# Patient Record
Sex: MALE | Race: WHITE | NOT HISPANIC OR LATINO | Employment: PART TIME | URBAN - METROPOLITAN AREA
[De-identification: names, ages, dates, MRNs, and addresses within clinical notes are randomized per-mention and may not be internally consistent; named-entity substitution may affect disease eponyms.]

---

## 2017-04-14 ENCOUNTER — TELEPHONE (OUTPATIENT)
Dept: OTHER | Facility: CLINIC | Age: 62
End: 2017-04-14

## 2017-04-14 NOTE — TELEPHONE ENCOUNTER
4/14/2017    Call Regarding Onboarding [employer]    Attempt 1    Message on voicemail     Comments:       Outreach   erik

## 2017-06-01 NOTE — TELEPHONE ENCOUNTER
6/1/2017    Call Regarding Onboarding [employer]    Attempt 2    Message on voicemail     Comments: No dep      Outreach   cnt

## 2017-06-08 NOTE — TELEPHONE ENCOUNTER
6/8/2017    Call Regarding Onboarding [employer]    Attempt 3    Message on voicemail     Comments: No dep        Outreach   cnt

## 2022-04-05 ENCOUNTER — TRANSFERRED RECORDS (OUTPATIENT)
Dept: HEALTH INFORMATION MANAGEMENT | Facility: CLINIC | Age: 67
End: 2022-04-05
Payer: MEDICARE

## 2022-04-21 ENCOUNTER — TRANSFERRED RECORDS (OUTPATIENT)
Dept: HEALTH INFORMATION MANAGEMENT | Facility: CLINIC | Age: 67
End: 2022-04-21
Payer: MEDICARE

## 2022-04-29 ENCOUNTER — MEDICAL CORRESPONDENCE (OUTPATIENT)
Dept: HEALTH INFORMATION MANAGEMENT | Facility: CLINIC | Age: 67
End: 2022-04-29
Payer: MEDICARE

## 2022-05-03 ENCOUNTER — TELEPHONE (OUTPATIENT)
Dept: INFECTIOUS DISEASES | Facility: CLINIC | Age: 67
End: 2022-05-03
Payer: COMMERCIAL

## 2022-05-03 NOTE — TELEPHONE ENCOUNTER
External - [facility] [phone]  Referring Provider:  Dr. Goode  DX: Lymes and Rash    Ref./rec. Were received in ID consult folder on 04.29.2022 @ 3:18 & 3:24    *Please review and advise on scheduling.

## 2022-05-03 NOTE — TELEPHONE ENCOUNTER
Per referring office will fax additional records. Please watch for records and advise on scheduling.

## 2022-05-03 NOTE — TELEPHONE ENCOUNTER
Please call referring for office notes. No provider documentation received. Has patient been treated?

## 2022-05-04 NOTE — TELEPHONE ENCOUNTER
Office visit note received.    Ok to schedule next available consult opening.    5/3/2022 12:18:51 PM Transmission Record  5/3/2022 12:09:02 PM Conversion  duplicate labs 5/3/2022 12:13:18 PM Transmission Record

## 2022-05-05 ENCOUNTER — LAB (OUTPATIENT)
Dept: LAB | Facility: CLINIC | Age: 67
End: 2022-05-05

## 2022-05-05 ENCOUNTER — HOSPITAL ENCOUNTER (OUTPATIENT)
Dept: GENERAL RADIOLOGY | Facility: HOSPITAL | Age: 67
Discharge: HOME | End: 2022-05-05
Attending: INTERNAL MEDICINE | Admitting: INTERNAL MEDICINE
Payer: COMMERCIAL

## 2022-05-05 ENCOUNTER — OFFICE VISIT (OUTPATIENT)
Dept: INFECTIOUS DISEASES | Facility: CLINIC | Age: 67
End: 2022-05-05
Payer: COMMERCIAL

## 2022-05-05 VITALS — SYSTOLIC BLOOD PRESSURE: 120 MMHG | DIASTOLIC BLOOD PRESSURE: 60 MMHG | WEIGHT: 129 LBS | HEART RATE: 72 BPM

## 2022-05-05 DIAGNOSIS — A69.23 LYME ARTHRITIS (H): Primary | ICD-10-CM

## 2022-05-05 DIAGNOSIS — A69.23 LYME ARTHRITIS (H): ICD-10-CM

## 2022-05-05 LAB
BASOPHILS # BLD AUTO: 0 10E3/UL (ref 0–0.2)
BASOPHILS NFR BLD AUTO: 0 %
C REACTIVE PROTEIN LHE: <0.1 MG/DL (ref 0–0.8)
EOSINOPHIL # BLD AUTO: 0.2 10E3/UL (ref 0–0.7)
EOSINOPHIL NFR BLD AUTO: 4 %
ERYTHROCYTE [DISTWIDTH] IN BLOOD BY AUTOMATED COUNT: 12.5 % (ref 10–15)
ERYTHROCYTE [SEDIMENTATION RATE] IN BLOOD BY WESTERGREN METHOD: 12 MM/HR (ref 0–15)
HCT VFR BLD AUTO: 36.1 % (ref 40–53)
HGB BLD-MCNC: 12.1 G/DL (ref 13.3–17.7)
IMM GRANULOCYTES # BLD: 0 10E3/UL
IMM GRANULOCYTES NFR BLD: 0 %
LDH SERPL L TO P-CCNC: 192 U/L (ref 125–220)
LYMPHOCYTES # BLD AUTO: 1.4 10E3/UL (ref 0.8–5.3)
LYMPHOCYTES NFR BLD AUTO: 26 %
MCH RBC QN AUTO: 32 PG (ref 26.5–33)
MCHC RBC AUTO-ENTMCNC: 33.5 G/DL (ref 31.5–36.5)
MCV RBC AUTO: 96 FL (ref 78–100)
MONOCYTES # BLD AUTO: 0.6 10E3/UL (ref 0–1.3)
MONOCYTES NFR BLD AUTO: 10 %
NEUTROPHILS # BLD AUTO: 3.3 10E3/UL (ref 1.6–8.3)
NEUTROPHILS NFR BLD AUTO: 59 %
PLATELET # BLD AUTO: 257 10E3/UL (ref 150–450)
RBC # BLD AUTO: 3.78 10E6/UL (ref 4.4–5.9)
WBC # BLD AUTO: 5.5 10E3/UL (ref 4–11)

## 2022-05-05 PROCEDURE — 86140 C-REACTIVE PROTEIN: CPT

## 2022-05-05 PROCEDURE — 99203 OFFICE O/P NEW LOW 30 MIN: CPT | Performed by: INTERNAL MEDICINE

## 2022-05-05 PROCEDURE — 85025 COMPLETE CBC W/AUTO DIFF WBC: CPT

## 2022-05-05 PROCEDURE — 85652 RBC SED RATE AUTOMATED: CPT

## 2022-05-05 PROCEDURE — 73630 X-RAY EXAM OF FOOT: CPT | Mod: LT,FY

## 2022-05-05 PROCEDURE — 83615 LACTATE (LD) (LDH) ENZYME: CPT

## 2022-05-05 PROCEDURE — 36415 COLL VENOUS BLD VENIPUNCTURE: CPT

## 2022-05-05 RX ORDER — HYDROCODONE BITARTRATE AND ACETAMINOPHEN 5; 325 MG/1; MG/1
1 TABLET ORAL EVERY 6 HOURS PRN
COMMUNITY
Start: 2022-04-28

## 2022-05-05 RX ORDER — MINOCYCLINE HYDROCHLORIDE 100 MG/1
1 CAPSULE ORAL EVERY 12 HOURS
COMMUNITY
Start: 2022-04-21 | End: 2022-05-05

## 2022-05-05 RX ORDER — METHYLSULFONYLMETHANE 500 MG
CAPSULE ORAL SEE ADMIN INSTRUCTIONS
COMMUNITY

## 2022-05-05 RX ORDER — CHLORAL HYDRATE 500 MG
1 CAPSULE ORAL DAILY
COMMUNITY

## 2022-05-05 RX ORDER — CHOLECALCIFEROL (VITAMIN D3) 50 MCG
1 TABLET ORAL
COMMUNITY

## 2022-05-05 RX ORDER — NYSTATIN 100000 U/G
CREAM TOPICAL
COMMUNITY

## 2022-05-05 RX ORDER — AMOXICILLIN 500 MG/1
2 TABLET, FILM COATED ORAL 2 TIMES DAILY
COMMUNITY
Start: 2022-04-08 | End: 2022-05-08

## 2022-05-05 RX ORDER — TRIAMCINOLONE ACETONIDE 1 MG/G
CREAM TOPICAL
COMMUNITY

## 2022-05-05 RX ORDER — DOXYCYCLINE HYCLATE 100 MG
TABLET ORAL
COMMUNITY
Start: 2022-04-08 | End: 2022-05-08

## 2022-05-05 NOTE — LETTER
5/5/2022         RE: Maico Dewitt  [address]  [place] [facility] [address]        Dear Colleague,    Thank you for referring your patient, Maico Dewitt, to the [facility]. Please see a copy of my visit note below.    [facility] INFECTIOUS DISEASE CLINIC - [place]    Date: 05/05/2022   Patient Name: Maico Dewitt   YOB: 1955  MRN: [id number]      ASSESSMENT:  66-year-old man without significant past medical history who was referred to ID clinic with recent diagnosis of Lyme arthritis.    1. Lyme arthritis.  Patient [place] in a tick endemic area.  Developed acute onset of left knee pain and swelling about 2 months ago.  Lyme IgG positive (10 out of 10 bands).  Received a 4-week course of amoxicillin.  He also received 2 weeks of doxycycline, followed by 2 weeks of minocycline (changed due to nausea symptoms).  Has had resolution of left knee pain.  Now complaining of 2 weeks of left foot pain.  No findings on exam.  Negative straight leg raise bilaterally      PLAN:  -No further antibiotics needed for Lyme disease  -Follow-up Lyme testing is not indicated as antibodies will stay positive  -Left foot x-ray  -We will check CBC, sed rate, CRP, and LDH  -Consider NSAIDs for pain as inflammation from Lyme arthritis can persist beyond treatment    Return to clinic as needed.    Kai Emery MD  [facility]   Clinic phone: [phone]   Clinic fax: [phone]     ______________________________________________________________________    HISTORY OF PRESENT ILLNESS:   Maico Dewitt is a 66 year old man who is referred for evaluation of Lyme arthritis.  The patient says [place] the [facility] [place] and is exposed to ticks regularly.  About 2 months ago he developed swelling and pain in his left knee.  A month ago he was diagnosed with Lyme disease and started on amoxicillin and doxycycline for 28 days.  He completed 2 weeks of doxycycline, but changed to  minocycline due to nausea.  He says the pain in his left knee has improved.  He saw an orthopedist at [facility] who did an x-ray and reported no signs of degenerative joint disease.  For the past 2 weeks, the patient has been having foot pain on the plantar surface of his left foot.  He reports shooting pain in this area along with warmth and redness.  He takes hydrocodone which helps.  He also takes ibuprofen intermittently.      Interval History:  Not applicable      Review of Systems:  Ten systems reviewed and negative except for what is noted in the HPI     Past Medical History:  No past medical history on file.    Past Surgical History:  History of spinal fusion    Allergies:  Allergies   Allergen Reactions     Beef-Derived Products Unknown       Medications:    Current Outpatient Medications:      amoxicillin (AMOXIL) 500 MG tablet, Take 2 tablets by mouth 2 times daily, Disp: , Rfl:      Ascorbic Acid (VITAMIN C) 500 MG CHEW, 2 tablet, Disp: , Rfl:      doxycycline hyclate (VIBRA-TABS) 100 MG tablet, 2 tablets, Disp: , Rfl:      fish oil-omega-3 fatty acids 1000 MG capsule, Take 1 capsule by mouth daily, Disp: , Rfl:      Garlic 1000 MG CAPS, See Admin Instructions, Disp: , Rfl:      Glucosamine-Chondroitin (GLUCOSAMINE CHONDR COMPLEX PO), , Disp: , Rfl:      HYDROcodone-acetaminophen (NORCO) 5-325 MG tablet, Take 1 tablet by mouth every 6 hours as needed, Disp: , Rfl:      Methylsulfonylmethane (MSM) 500 MG CAPS, See Admin Instructions, Disp: , Rfl:      minocycline (MINOCIN) 100 MG capsule, Take 1 capsule by mouth every 12 hours, Disp: , Rfl:      Multiple Vitamin (MULTIVITAMINS PO), , Disp: , Rfl:      N-Ovvmmhigwiiobtpqxh-O3-B12-FA (MOOD PLUS STRESS RELIEF PO), 1, Disp: , Rfl:      vitamin D3 (CHOLECALCIFEROL) 50 mcg (2000 units) tablet, Take 1 tablet by mouth, Disp: , Rfl:      nystatin (MYCOSTATIN) 759264 UNIT/GM external cream, USE ONE APPLICATION EXTERNALLY TWICE A DAY 30 DAYS (Patient not  taking: Reported on 5/5/2022), Disp: , Rfl:      triamcinolone (KENALOG) 0.1 % external cream, APPLY TO AFFECTED AREA TWICE DAILY FOR 30 DAYS (Patient not taking: Reported on 5/5/2022), Disp: , Rfl:     Social History:  Social History     Socioeconomic History     Marital status:      Spouse name: Not on file     Number of children: Not on file     Years of education: Not on file     Highest education level: Not on file   Occupational History     Not on file   Tobacco Use     Smoking status: Not on file     Smokeless tobacco: Not on file   Substance and Sexual Activity     Alcohol use: Not on file     Drug use: Not on file     Sexual activity: Not on file   Other Topics Concern     Not on file   Social History Narrative     Not on file     Social Determinants of Health     Financial Resource Strain: Not on file   Food Insecurity: Not on file   Transportation Needs: Not on file   Physical Activity: Not on file   Stress: Not on file   Social Connections: Not on file   Intimate Partner Violence: Not on file   Housing Stability: Not on file        Family History:  Both parents with lung cancer      PHYSICAL EXAM:    /60 (BP Location: Right arm, Patient Position: Sitting, Cuff Size: Adult Regular)   Pulse 72   Wt 58.5 kg (129 lb)     GENERAL:  well-developed, well-nourished, in no acute distress.   HENT:  Head is normocephalic, atraumatic. Oropharynx is moist without exudates or ulcers.  EYES:  Eyes have anicteric sclerae without conjunctival injection or stigmata of endocarditis.    NECK:  Supple. No cervical lymphadenopathy  LUNGS:  Clear to auscultation.  CARDIOVASCULAR:  Regular rate and rhythm with no murmurs, gallops or rubs.  ABDOMEN:  Normal bowel sounds, soft, nontender. No hepatosplenomegaly.  MUSCULOSKELETAL: Small effusion noted in the left knee without any erythema or overt swelling.  Left foot without swelling in the ankle or lower foot.  No noticeable erythema.  No ulcerations.  Good range  of motion in the ankle.  SKIN:  No acute rashes. No stigmata of endocarditis.  NEUROLOGIC: Grossly nonfocal.  Using a walker for ambulation        Pertinent labs:  Outside labs including Lyme serologies and CBC were reviewed    MICROBIOLOGY DATA:  None    RADIOLOGY:  None    Attestation:  Total time preparing to see this patient, face-to-face time, and coordinating care time on the same calendar date: 35 minutes.  Face-face time: 19 minutes.  Over 50% of face-to-face time was spent in counseling/coordination of care.        Again, thank you for allowing me to participate in the care of your patient.        Sincerely,        Kai Emery MD

## 2022-05-05 NOTE — PROGRESS NOTES
[facility] INFECTIOUS DISEASE CLINIC [facility]    Date: 05/05/2022   Patient Name: Maico Dewitt   YOB: 1955  MRN: [id number]      ASSESSMENT:  66-year-old man without significant past medical history who was referred to ID clinic with recent diagnosis of Lyme arthritis.    1. Lyme arthritis.  Patient [place] in a tick endemic area.  Developed acute onset of left knee pain and swelling about 2 months ago.  Lyme IgG positive (10 out of 10 bands).  Received a 4-week course of amoxicillin.  He also received 2 weeks of doxycycline, followed by 2 weeks of minocycline (changed due to nausea symptoms).  Has had resolution of left knee pain.  Now complaining of 2 weeks of left foot pain.  No findings on exam.  Negative straight leg raise bilaterally      PLAN:  -No further antibiotics needed for Lyme disease  -Follow-up Lyme testing is not indicated as antibodies will stay positive  -Left foot x-ray  -We will check CBC, sed rate, CRP, and LDH  -Consider NSAIDs for pain as inflammation from Lyme arthritis can persist beyond treatment    Return to clinic as needed.    Kai Emery MD  [facility]   Clinic phone: [phone]   Clinic fax: [phone]     ______________________________________________________________________    HISTORY OF PRESENT ILLNESS:   Maico Dewitt is a 66 year old man who is referred for evaluation of Lyme arthritis.  The patient says he lives on the [facility] [place] and is exposed to ticks regularly.  About 2 months ago he developed swelling and pain in his left knee.  A month ago he was diagnosed with Lyme disease and started on amoxicillin and doxycycline for 28 days.  He completed 2 weeks of doxycycline, but changed to minocycline due to nausea.  He says the pain in his left knee has improved.  He saw an orthopedist at [facility] who did an x-ray and reported no signs of degenerative joint disease.  For the past 2 weeks, the patient has been having foot  pain on the plantar surface of his left foot.  He reports shooting pain in this area along with warmth and redness.  He takes hydrocodone which helps.  He also takes ibuprofen intermittently.      Interval History:  Not applicable      Review of Systems:  Ten systems reviewed and negative except for what is noted in the HPI     Past Medical History:  No past medical history on file.    Past Surgical History:  History of spinal fusion    Allergies:  Allergies   Allergen Reactions     Beef-Derived Products Unknown       Medications:    Current Outpatient Medications:      amoxicillin (AMOXIL) 500 MG tablet, Take 2 tablets by mouth 2 times daily, Disp: , Rfl:      Ascorbic Acid (VITAMIN C) 500 MG CHEW, 2 tablet, Disp: , Rfl:      doxycycline hyclate (VIBRA-TABS) 100 MG tablet, 2 tablets, Disp: , Rfl:      fish oil-omega-3 fatty acids 1000 MG capsule, Take 1 capsule by mouth daily, Disp: , Rfl:      Garlic 1000 MG CAPS, See Admin Instructions, Disp: , Rfl:      Glucosamine-Chondroitin (GLUCOSAMINE CHONDR COMPLEX PO), , Disp: , Rfl:      HYDROcodone-acetaminophen (NORCO) 5-325 MG tablet, Take 1 tablet by mouth every 6 hours as needed, Disp: , Rfl:      Methylsulfonylmethane (MSM) 500 MG CAPS, See Admin Instructions, Disp: , Rfl:      minocycline (MINOCIN) 100 MG capsule, Take 1 capsule by mouth every 12 hours, Disp: , Rfl:      Multiple Vitamin (MULTIVITAMINS PO), , Disp: , Rfl:      W-Rmtsdyduwambwikrvk-U9-B12-FA (MOOD PLUS STRESS RELIEF PO), 1, Disp: , Rfl:      vitamin D3 (CHOLECALCIFEROL) 50 mcg (2000 units) tablet, Take 1 tablet by mouth, Disp: , Rfl:      nystatin (MYCOSTATIN) 140594 UNIT/GM external cream, USE ONE APPLICATION EXTERNALLY TWICE A DAY 30 DAYS (Patient not taking: Reported on 5/5/2022), Disp: , Rfl:      triamcinolone (KENALOG) 0.1 % external cream, APPLY TO AFFECTED AREA TWICE DAILY FOR 30 DAYS (Patient not taking: Reported on 5/5/2022), Disp: , Rfl:     Social History:  Social History      Socioeconomic History     Marital status:      Spouse name: Not on file     Number of children: Not on file     Years of education: Not on file     Highest education level: Not on file   Occupational History     Not on file   Tobacco Use     Smoking status: Not on file     Smokeless tobacco: Not on file   Substance and Sexual Activity     Alcohol use: Not on file     Drug use: Not on file     Sexual activity: Not on file   Other Topics Concern     Not on file   Social History Narrative     Not on file     Social Determinants of Health     Financial Resource Strain: Not on file   Food Insecurity: Not on file   Transportation Needs: Not on file   Physical Activity: Not on file   Stress: Not on file   Social Connections: Not on file   Intimate Partner Violence: Not on file   Housing Stability: Not on file        Family History:  Both parents with lung cancer      PHYSICAL EXAM:    /60 (BP Location: Right arm, Patient Position: Sitting, Cuff Size: Adult Regular)   Pulse 72   Wt 58.5 kg (129 lb)     GENERAL:  well-developed, well-nourished, in no acute distress.   HENT:  Head is normocephalic, atraumatic. Oropharynx is moist without exudates or ulcers.  EYES:  Eyes have anicteric sclerae without conjunctival injection or stigmata of endocarditis.    NECK:  Supple. No cervical lymphadenopathy  LUNGS:  Clear to auscultation.  CARDIOVASCULAR:  Regular rate and rhythm with no murmurs, gallops or rubs.  ABDOMEN:  Normal bowel sounds, soft, nontender. No hepatosplenomegaly.  MUSCULOSKELETAL: Small effusion noted in the left knee without any erythema or overt swelling.  Left foot without swelling in the ankle or lower foot.  No noticeable erythema.  No ulcerations.  Good range of motion in the ankle.  SKIN:  No acute rashes. No stigmata of endocarditis.  NEUROLOGIC: Grossly nonfocal.  Using a walker for ambulation        Pertinent labs:  Outside labs including Lyme serologies and CBC were  reviewed    MICROBIOLOGY DATA:  None    RADIOLOGY:  None    Attestation:  Total time preparing to see this patient, face-to-face time, and coordinating care time on the same calendar date: 35 minutes.  Face-face time: 19 minutes.  Over 50% of face-to-face time was spent in counseling/coordination of care.